# Patient Record
Sex: MALE | Race: WHITE | NOT HISPANIC OR LATINO | Employment: FULL TIME | URBAN - METROPOLITAN AREA
[De-identification: names, ages, dates, MRNs, and addresses within clinical notes are randomized per-mention and may not be internally consistent; named-entity substitution may affect disease eponyms.]

---

## 2024-01-24 ENCOUNTER — OFFICE VISIT (OUTPATIENT)
Dept: PAIN MEDICINE | Facility: HOSPITAL | Age: 49
End: 2024-01-24
Payer: COMMERCIAL

## 2024-01-24 VITALS
BODY MASS INDEX: 20.17 KG/M2 | SYSTOLIC BLOOD PRESSURE: 135 MMHG | DIASTOLIC BLOOD PRESSURE: 89 MMHG | WEIGHT: 157.2 LBS | RESPIRATION RATE: 16 BRPM | HEIGHT: 74 IN | HEART RATE: 88 BPM

## 2024-01-24 DIAGNOSIS — G54.5 PARSONAGE-TURNER SYNDROME: ICD-10-CM

## 2024-01-24 DIAGNOSIS — M25.511 CHRONIC RIGHT SHOULDER PAIN: Primary | ICD-10-CM

## 2024-01-24 DIAGNOSIS — G89.29 CHRONIC RIGHT SHOULDER PAIN: Primary | ICD-10-CM

## 2024-01-24 PROCEDURE — 99214 OFFICE O/P EST MOD 30 MIN: CPT | Performed by: PHYSICAL MEDICINE & REHABILITATION

## 2024-01-24 PROCEDURE — 99204 OFFICE O/P NEW MOD 45 MIN: CPT | Performed by: PHYSICAL MEDICINE & REHABILITATION

## 2024-01-24 RX ORDER — TOPIRAMATE 25 MG/1
TABLET ORAL
Qty: 256 TABLET | Refills: 0 | Status: SHIPPED | OUTPATIENT
Start: 2024-01-24

## 2024-01-24 RX ORDER — METOPROLOL TARTRATE 25 MG/1
25 TABLET, FILM COATED ORAL 2 TIMES DAILY
COMMUNITY

## 2024-01-24 RX ORDER — CLONAZEPAM 0.5 MG/1
0.5 TABLET ORAL 2 TIMES DAILY PRN
COMMUNITY

## 2024-01-24 RX ORDER — METHYLPHENIDATE HYDROCHLORIDE 5 MG/1
5 TABLET ORAL 2 TIMES DAILY
COMMUNITY
Start: 2024-01-04

## 2024-01-24 RX ORDER — BUPRENORPHINE 7.5 UG/H
1 PATCH TRANSDERMAL WEEKLY
COMMUNITY
Start: 2023-12-26

## 2024-01-24 SDOH — SOCIAL STABILITY: SOCIAL NETWORK: SOCIAL ACTIVITY:: 8

## 2024-01-24 ASSESSMENT — COLUMBIA-SUICIDE SEVERITY RATING SCALE - C-SSRS
2. HAVE YOU ACTUALLY HAD ANY THOUGHTS OF KILLING YOURSELF?: NO
6. HAVE YOU EVER DONE ANYTHING, STARTED TO DO ANYTHING, OR PREPARED TO DO ANYTHING TO END YOUR LIFE?: NO
1. IN THE PAST MONTH, HAVE YOU WISHED YOU WERE DEAD OR WISHED YOU COULD GO TO SLEEP AND NOT WAKE UP?: NO

## 2024-01-24 ASSESSMENT — PATIENT HEALTH QUESTIONNAIRE - PHQ9
SUM OF ALL RESPONSES TO PHQ9 QUESTIONS 1 AND 2: 1
2. FEELING DOWN, DEPRESSED OR HOPELESS: SEVERAL DAYS
10. IF YOU CHECKED OFF ANY PROBLEMS, HOW DIFFICULT HAVE THESE PROBLEMS MADE IT FOR YOU TO DO YOUR WORK, TAKE CARE OF THINGS AT HOME, OR GET ALONG WITH OTHER PEOPLE: NOT DIFFICULT AT ALL
1. LITTLE INTEREST OR PLEASURE IN DOING THINGS: NOT AT ALL

## 2024-01-24 ASSESSMENT — ENCOUNTER SYMPTOMS
OCCASIONAL FEELINGS OF UNSTEADINESS: 0
LOSS OF SENSATION IN FEET: 0
DEPRESSION: 0

## 2024-01-24 NOTE — PROGRESS NOTES
Subjective   Patient ID: Fitz Forrester is a 48 y.o. male who presents for Shoulder Pain (right) and Knee Pain (left).  HPI    Patient presents to office for initial eval of left knee pain and right shoulder pain. Shoulder pain radiates towards the neck, knee pain without radiation. Has had left knee replacement.    Pain Score: 7/10    Injections/Procedures: has had knee injections in past without relief, denies shoulder injections    Neuromodulators/Other Medications: denies    Other: denies    I had the pleasure of meeting Fitz Forrester, a 48 y.o. 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 States right shoulder is causing him more discomfort than knee.  No history of glaucoma or kidney stones    Sees a pain doctor with the [facility] system. Prescribed buprenorphine patch without any benefit    TIMELINE OF COMPLAINT(S):  Knee pain began in 2020, has had 14-15 surgeries on the left knee per patient including revisions, scopes. Has burning in the anterior aspect of the knee with numbness and tingling in the knee. Weakness present.     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 Right shoulder pain periscapular and goes over anterior fingers, no pain in arms, forearm or fingers. Pain described as burning stabbing.      Pain:  TRIED-      Anti-Inflammatories: Ibuprofen, NSAIDs, tylenol not helpful      Muscle relaxants: Tried tizanidine in the past, not helpful.       Anti-depressants: Was on lexapro 20 mg, has not tried cymbalta but nervous about serotonin syndrome      Neuroleptics: Tried gabapentin 600 mg TID, no side  effects, no help. Has not tried lyrica or topamax     PHYSICAL THERAPY: Restarting this week, still doing home exercises, band work. Tried dry needling in the past without much benefit  TENS unit: Not helpful  CHIROPRACTIC MANIPULATION: None  ACUPUNCTURE TREATMENTS: 3 visits, not helpful  DEEP TISSUE MASSAGE THERAPY: None  INJECTIONS: None  EMG/NCS: Had an EMG around May 2022, confirmed parsonage renteria     IMAGING:  Xray of shoulder 3/13/23: No acute fracture or dislocation, gelnohumeral joint space and acromiohumeral interval are maintained. Minimal degenerative change of the AC joint     FUNCTIONAL HISTORY: The patient is independent in all ADLs, mobility, and driving. The patient does not use any assistive device.     SH:  Occupation: Optometrist  Tobacco/Alcohol/Drugs: Drinks 2-3 drinks per week            Pain Disability Index  Family/Home Responsibilities:: 7  Recreation:: 8  Social Activity:: 8  Occupation:: 6  Sexual Behavior:: 5  Self Care:: 6  Life-Support Activities:: 4       OARRS:  No data recorded  I have personally reviewed the OARRS report for Fitz TOTH Usama. I have considered the risks of abuse, dependence, addiction and diversion      Monitoring and compliance:    ORT: n/a    PDUQ: n/a    Office Agreement: 01/24/2024      Review of Systems     Current Outpatient Medications   Medication Instructions    buprenorphine (Butrans) 7.5 mcg/hour 1 patch, transdermal, Weekly    clonazePAM (KLONOPIN) 0.5 mg, oral, 2 times daily PRN    methylphenidate (RITALIN) 5 mg, oral, 2 times daily    metoprolol tartrate (LOPRESSOR) 25 mg, oral, 2 times daily    topiramate (Topamax) 25 mg tablet Week 1 25mg at bedtime, week 2 25mg BID, week 3 25mg qAM, 50mg at bedtime, week 4 50 mg BID, week 5 50mg qAM 75mg at bedtime, week 6 75 mg BID, week 7 75mg qam 100mg at bedtime, week 8 100mg BID        No past medical history on file.     No past surgical history on file.     No family history on file.     Allergies   Allergen  Reactions    Ceftaroline Fosamil Anaphylaxis, Hives, Rash and Unknown     Localized petechiae    Doxycycline GI Upset and Nausea/vomiting     Other reaction(s): GI Upset, Is able to take doxycycline monohydrate    Is able to take doxycycline monohydrate    Tree Nut Anaphylaxis, Hives, Itching, Shortness of breath and Swelling    Amoxicillin Hives        Objective     Vitals:    01/24/24 1421   BP: 135/89   Pulse: 88   Resp: 16        Physical Exam    GENERAL EXAM  Vital Signs: Vital signs to include heart rate, respiration rate, blood pressure, and temperature were reviewed.  General Appearance:  Awake, alert, healthy appearing, well developed, No acute distress.  Head: Normocephalic without evidence of head injury.  Neck: The appearance of the neck was normal without swelling with a midline trachea.  Eyes: The eyelids and eyebrows exhibited no abnormalities.  Pupils were not pin-point.  Sclera was without icterus.  Lungs: Respiration rhythm and depth was normal.  Respiratory movements were normal without labored breathing.  Cardiovascular: No peripheral edema was present.    Neurological: Patient was oriented to time, place, and person.  Speech was normal.  Balance, gait, and stance were unremarkable.    Psychiatric: Appearance was normal with appropriate dress.  Mood was euthymic and affect was normal.  Skin: Affected regions were without ecchymosis or skin lesions.    Physical exam as above except:     Neck/Shoulder: Significant lateral winging. Tenderness over trapezii and periscapular muscles. Empty can mildly positive. Mild tenderness over right AC joint bicipital groove tenderness. Full cervical ROM     Knee: right knee erythematous slightly swollen compared to left. No tenderness to palpation over medial, lateral joint lines, no tenderness over anterior patella or patellar tendon     NEURO - UE strength 5/5 -  including shoulder abduction, biceps, triceps, wrist extensors, finger flexors, interossei, and    GAIT - Normal base, normal stride length, non-antalgic. Able to toe walk and heel walk without difficulty. Able to perform tandem gait without difficulty.   Using ice pack over left knee      Assessment/Plan   Diagnoses and all orders for this visit:  Chronic right shoulder pain  -     topiramate (Topamax) 25 mg tablet; Week 1 25mg at bedtime, week 2 25mg BID, week 3 25mg qAM, 50mg at bedtime, week 4 50 mg BID, week 5 50mg qAM 75mg at bedtime, week 6 75 mg BID, week 7 75mg qam 100mg at bedtime, week 8 100mg BID  Parsonage-Renteria syndrome  -     topiramate (Topamax) 25 mg tablet; Week 1 25mg at bedtime, week 2 25mg BID, week 3 25mg qAM, 50mg at bedtime, week 4 50 mg BID, week 5 50mg qAM 75mg at bedtime, week 6 75 mg BID, week 7 75mg qam 100mg at bedtime, week 8 100mg BID    48 year old male with pmhx of lateral scapular wining, parsonage renteria extensive history of surgery including nerve transfers including thoracodorsal transfer to long thoracic nerve, extensive right knee surgery extremely complex patient who has tried many medications and procedures without much improvement of pain. Shoulder pain likely to improve as nerve heals following transfer.    -Begin topamax, uptitration instructions provided  -I recommend patient discontinue buprenorphine patch as it is not helping with pain and opiates are generally not indicated anyway for noncancer musculoskeletal pain  -Continue home and formal therapy  - Patient will follow-up with my nurse practitioner in 2 months.  Future considerations could be for spinal cord stimulation or sprint peripheral nerve stimulation of suprascapular and axillary nerves.       This note was generated with the aid of dictation software, there may be typos despite my attempts at proofreading.     Patient seen and examined by resident with attending supervision (Dr. Griffith), attending agrees with history, exam, and plan as described in the note above    Shelli Junior DO   PM&R  PGY-IV    I saw and evaluated the patient. I personally obtained the key and critical portions of the history and physical exam or was physically present for key and critical portions performed by the resident/fellow. I reviewed the resident/fellow's documentation and discussed the patient with the resident/fellow. I agree with the resident/fellow's medical decision making as documented in the note.    Emerson Griffith MD

## 2024-03-20 ENCOUNTER — APPOINTMENT (OUTPATIENT)
Dept: PAIN MEDICINE | Facility: HOSPITAL | Age: 49
End: 2024-03-20
Payer: COMMERCIAL

## 2024-10-03 DIAGNOSIS — M25.511 RIGHT SHOULDER PAIN, UNSPECIFIED CHRONICITY: ICD-10-CM

## 2024-10-04 ENCOUNTER — APPOINTMENT (OUTPATIENT)
Dept: RADIOLOGY | Facility: CLINIC | Age: 49
End: 2024-10-04
Payer: COMMERCIAL

## 2024-10-04 ENCOUNTER — OFFICE VISIT (OUTPATIENT)
Dept: ORTHOPEDIC SURGERY | Facility: CLINIC | Age: 49
End: 2024-10-04
Payer: COMMERCIAL

## 2024-10-04 VITALS — BODY MASS INDEX: 18.61 KG/M2 | WEIGHT: 145 LBS | HEIGHT: 74 IN

## 2024-10-04 DIAGNOSIS — G54.5 PARSONAGE-TURNER SYNDROME: Primary | ICD-10-CM

## 2024-10-04 DIAGNOSIS — M25.311 DYSKINESIS OF RIGHT SCAPULA: ICD-10-CM

## 2024-10-04 PROCEDURE — 1036F TOBACCO NON-USER: CPT | Performed by: STUDENT IN AN ORGANIZED HEALTH CARE EDUCATION/TRAINING PROGRAM

## 2024-10-04 PROCEDURE — 99202 OFFICE O/P NEW SF 15 MIN: CPT | Performed by: STUDENT IN AN ORGANIZED HEALTH CARE EDUCATION/TRAINING PROGRAM

## 2024-10-04 PROCEDURE — 3008F BODY MASS INDEX DOCD: CPT | Performed by: STUDENT IN AN ORGANIZED HEALTH CARE EDUCATION/TRAINING PROGRAM

## 2024-10-04 RX ORDER — METHADONE HYDROCHLORIDE 5 MG/1
2.5 TABLET ORAL EVERY 12 HOURS
COMMUNITY

## 2024-10-04 RX ORDER — OXYCODONE HCL 10 MG/1
10 TABLET, FILM COATED, EXTENDED RELEASE ORAL EVERY 12 HOURS
COMMUNITY

## 2024-10-04 NOTE — PROGRESS NOTES
CHIEF COMPLAINT: Right shoulder pain     History: 49 y.o. male presents to the office today for evaluation of his right shoulder and upper extremity.  The patient works as an eye physician.  He does a very complex situation.  The patient underwent a partial knee replacement and had complications that eventually led to him getting Parsonage-Murillo syndrome.  This affected both of his arms, mostly his right side.  His left side has recovered although he still has some infraspinatus weakness.  Unfortunately the right side, he has persistent, constant pain even at rest in the periscapular and trapezial areas.  He has medial scapular winging.  He has already underwent a long thoracic nerve procedure with somebody at [facility] which did not provide any benefit for the patient.  Here today for a second opinion.  He is already seeing one of our other specialists in [facility].  Has functional difficulties going overhead, but it seems like the pain is the biggest issue.    Past medical history, past surgical history, medications, allergies, family history, social history, and review of systems were reviewed today.    A 12 point review of systems was negative other than as stated in the HPI.    No past medical history on file.     Allergies   Allergen Reactions    Ceftaroline Fosamil Anaphylaxis, Hives, Rash and Unknown     Localized petechiae    Doxycycline GI Upset and Nausea/vomiting     Other reaction(s): GI Upset, Is able to take doxycycline monohydrate    Is able to take doxycycline monohydrate    Tree Nut Anaphylaxis, Hives, Itching, Shortness of breath and Swelling    Amoxicillin Hives        No past surgical history on file.     No family history on file.     Social History     Socioeconomic History    Marital status:      Spouse name: Not on file    Number of children: Not on file    Years of education: Not on file    Highest education level: Not on file   Occupational History    Not on file   Tobacco Use     Smoking status: Never    Smokeless tobacco: Never   Substance and Sexual Activity    Alcohol use: Not on file    Drug use: Not on file    Sexual activity: Not on file   Other Topics Concern    Not on file   Social History Narrative    Not on file     Social Determinants of Health     Financial Resource Strain: Not on file   Food Insecurity: No Food Insecurity (9/19/2024)    Received from [facility]    Hunger Vital Sign     Worried About Running Out of Food in the Last Year: Never true     Ran Out of Food in the Last Year: Never true   Transportation Needs: No Transportation Needs (9/19/2024)    Received from [facility]    PRAPARE - Transportation     Lack of Transportation (Medical): No     Lack of Transportation (Non-Medical): No   Physical Activity: Not on file   Stress: Not on file   Social Connections: Not on file   Intimate Partner Violence: Not on file   Housing Stability: Unknown (9/19/2024)    Received from [facility]    Housing Stability Vital Sign     Unable to Pay for Housing in the Last Year: No     Number of Times Moved in the Last Year: Not on file     Homeless in the Last Year: No        CURRENT MEDICATIONS:   Current Outpatient Medications   Medication Sig Dispense Refill    buprenorphine (Butrans) 7.5 mcg/hour Place 1 patch on the skin once a week.      clonazePAM (KlonoPIN) 0.5 mg tablet Take 1 tablet (0.5 mg) by mouth 2 times a day as needed.      methylphenidate (Ritalin) 5 mg tablet Take 1 tablet (5 mg) by mouth 2 times a day.      metoprolol tartrate (Lopressor) 25 mg tablet Take 1 tablet (25 mg) by mouth 2 times a day.      topiramate (Topamax) 25 mg tablet Week 1 25mg at bedtime, week 2 25mg BID, week 3 25mg qAM, 50mg at bedtime, week 4 50 mg BID, week 5 50mg qAM 75mg at bedtime, week 6 75 mg BID, week 7 75mg qam 100mg at bedtime, week 8 100mg  tablet 0     No current facility-administered medications for this visit.       Physical Examination:      5/12/2022    11:56  "AM 1/24/2024     2:21 PM   Vitals   Systolic 129 135   Diastolic 91 89   Heart Rate 105 88   Resp 18 16   Height (in) 1.88 m (6' 2\") 1.88 m (6' 2\")   Weight (lb) 160 157.2   BMI 20.54 kg/m2 20.18 kg/m2   BSA (m2) 1.95 m2 1.93 m2   Visit Report  Report      There is no height or weight on file to calculate BMI.    Well-appearing, appears stated age, pleasant and cooperative, appropriate mood and behavior. Height and weight reviewed. Alert and oriented x3.  Auditory function intact.  No acute distress.  Intact ocular function, TERRENCE, EOMI. Breathing is unlabored .  There is no evidence of jugular venous distension. Skin appearance is normal without evidence of rash or other lesions. 2+ radial pulses bilaterally, fingers pink and wwp, good capillary refill, no pitting edema. No appreciable lymphadenopathy in bilateral upper extremities. SILT throughout both upper extremities, median/radial/ulnar/musculocutaneous/axillary nerve motor and sensory intact (except for abnormalities noted in focused musculoskeletal exam section below).     Neck exam: Full range of motion of the neck in flexion/extension and rotational movements. No significant areas of tenderness to palpation in the neck.    On exam of bilateral upper extremities, on gross inspection at rest, the scapula is anteriorly tilted.  Active forward flexion demonstrates medial scapular winging on the right.  Forward flexion about 120 degrees, external rotation to 40, internal rotation to T12.  On the left he has forward flexion about 160.  With scapular assist, he is able to demonstrate forward flexion about 160 degrees on the right side which is significantly improved.  Rotator cuff strength is preserved.    Imaging: Outside imaging and MRIs reviewed.  No significant abnormalities found on brachial plexus MRI.  Radiographs are within normal limits.    Assessment: Parsonage-Murillo syndrome, chronic pain, medial scapular winging    Plan: I had extensive discussion " with the patient and his family about treatment options going forward.  I did discuss with him that this is very complex situation.  From my standpoint I think he actually has 2 things going on.  First, I think the pain is primarily related to his Parsonage-Murillo syndrome and is nerve related.  He has pain at rest even without motion of the arm.  This seems to be the primary issue.  He also has difficulty with functionality in the right shoulder due to the medial scapular winging.  I do think that his serratus anterior is not functioning, as with scapular assist, he is able to demonstrate much better range of motion.  I did discuss that a pec transfer would be indicated for the treatment of medial scapular winging in isolation, but I do not think that would really help his pain significantly.  From my standpoint, I do think that seeing pain management to discuss possible avenues for pain diminishment are very reasonable.  In terms of the scapula, I do think getting a second opinion from the global expert Dr. Dez white at [facility] would be warranted.  We will see if we can reach out to his team to help the patient get into see him.  All questions were answered.    Dragon software was used to dictate this note, please be aware that minor errors in transcription may be present.    Erickson Venegas MD    Shoulder/Elbow Surgery  [facility]